# Patient Record
Sex: MALE | Race: WHITE | NOT HISPANIC OR LATINO | Employment: OTHER | URBAN - NONMETROPOLITAN AREA
[De-identification: names, ages, dates, MRNs, and addresses within clinical notes are randomized per-mention and may not be internally consistent; named-entity substitution may affect disease eponyms.]

---

## 2021-12-03 NOTE — PROGRESS NOTES
Patient: All Guzman    YOB: 1945    Date: 12/06/2021    Primary Care Provider: Armando Medrano DO    Chief Complaint   Patient presents with   • Cholelithiasis     Calculus of gallbladder without cholecystitis       SUBJECTIVE:    History of present illness:  I saw the patient in the office today as a consultation for evaluation and treatment of abdominal pain and diarrhea. Patient had recent CT scan of the abdomen and pelvis which showed cholelithiasis.    He does complain of right upper quadrant abdominal discomfort but he also complains of right sided chest discomfort associated with exercise.  He does have a history significant for coronary artery disease and has a cardiologist in [place], [place].    The following portions of the patient's history were reviewed and updated as appropriate: allergies, current medications, past family history, past medical history, past social history, past surgical history and problem list.    Review of Systems   Constitutional: Negative for chills, fever and unexpected weight change.   HENT: Negative for trouble swallowing and voice change.    Eyes: Negative for visual disturbance.   Respiratory: Negative for apnea, cough, chest tightness, shortness of breath and wheezing.    Cardiovascular: Negative for chest pain, palpitations and leg swelling.   Gastrointestinal: Positive for abdominal pain and diarrhea. Negative for abdominal distention, anal bleeding, blood in stool, constipation, nausea, rectal pain and vomiting.   Endocrine: Negative for cold intolerance and heat intolerance.   Genitourinary: Negative for difficulty urinating, dysuria, flank pain, scrotal swelling and testicular pain.   Musculoskeletal: Negative for back pain, gait problem and joint swelling.   Skin: Negative for color change, rash and wound.   Neurological: Negative for dizziness, syncope, speech difficulty, weakness, numbness and headaches.   Hematological: Negative for adenopathy.  Does not bruise/bleed easily.   Psychiatric/Behavioral: Negative for confusion. The patient is not nervous/anxious.        History:  Past Medical History:   Diagnosis Date   • Hypertension    • Myocardial infarction (HCC)        Past Surgical History:   Procedure Laterality Date   • EYE SURGERY     • TYMPANOPLASTY         Family History   Problem Relation Age of Onset   • Heart disease Mother    • Diabetes Mother    • Heart disease Father        Social History     Tobacco Use   • Smoking status: Former Smoker   • Smokeless tobacco: Current User     Types: Chew   Substance Use Topics   • Alcohol use: Defer   • Drug use: Defer       Allergies:  Allergies   Allergen Reactions   • Cefdinir Other (See Comments)       Medications:    Current Outpatient Medications:   •  ASPIRIN 81 PO, Take 1 tablet every day by oral route., Disp: , Rfl:   •  atenolol (TENORMIN) 50 MG tablet, atenolol 50 mg tablet, Disp: , Rfl:   •  cetirizine (Allergy Relief Cetirizine) 10 MG tablet, cetirizine   tab 10mgcetirizine hcl, Disp: , Rfl:   •  digoxin (LANOXIN) 125 MCG tablet, digoxin 125 mcg (0.125 mg) tablet  Take 1 tablet every day by oral route for 30 days., Disp: , Rfl:   •  HYDROcodone-homatropine (HYCODAN) 5-1.5 MG/5ML syrup, Take  by mouth Every 6 (Six) Hours As Needed for Cough., Disp: , Rfl:   •  lisinopril (PRINIVIL,ZESTRIL) 10 MG tablet, lisinopril 10 mg tablet  Take 1 tablet every day by oral route for 30 days., Disp: , Rfl:   •  lovastatin (MEVACOR) 20 MG tablet, lovastatin 20 mg tablet  Take 1 tablet every day by oral route for 30 days., Disp: , Rfl:   •  meclizine (ANTIVERT) 25 MG tablet, meclizine 25 mg tablet  Take 1 tablet as needed by oral route as needed for 30 days., Disp: , Rfl:   •  montelukast (SINGULAIR) 10 MG tablet, Take 10 mg by mouth Every Night., Disp: , Rfl:   •  omeprazole (priLOSEC) 20 MG capsule, omeprazole 20 mg capsule,delayed release, Disp: , Rfl:   •  rivaroxaban (Xarelto) 20 MG tablet, Xarelto 20 mg  "tablet  Take 1 tablet every day by oral route for 30 days., Disp: , Rfl:   •  tamsulosin (FLOMAX) 0.4 MG capsule 24 hr capsule, tamsulosin 0.4 mg capsule, Disp: , Rfl:     OBJECTIVE:    Vital Signs:   Vitals:    12/06/21 1103   BP: 120/62   Pulse: 90   Resp: 18   Temp: 97.1 °F (36.2 °C)   TempSrc: Temporal   SpO2: 98%   Weight: 85 kg (187 lb 6.4 oz)   Height: 182.9 cm (72\")       Physical Exam:   General Appearance:    Alert, cooperative, in no acute distress   Head:    Normocephalic, without obvious abnormality, atraumatic   Eyes:            Lids and lashes normal, conjunctivae and sclerae normal, no   icterus, no pallor, corneas clear, PERRLA   Ears:    Ears appear intact with no abnormalities noted   Throat:   No oral lesions, no thrush, oral mucosa moist   Neck:   No adenopathy, supple, trachea midline, no thyromegaly, no   carotid bruit, no JVD   Lungs:     Clear to auscultation,respirations regular, even and                  unlabored    Heart:    Regular rhythm and normal rate, normal S1 and S2, no            murmur   Abdomen:     no masses, no organomegaly, soft non-tender, non-distended, no guarding, there is evidence of right upper quadrant tenderness, slight right upper quadrant tenderness to deep palpation   Extremities:   Moves all extremities well, no edema, no cyanosis, no             redness   Pulses:   Pulses palpable and equal bilaterally   Skin:   No bleeding, bruising or rash   Lymph nodes:   No palpable adenopathy   Neurologic:   Cranial nerves 2 - 12 grossly intact, sensation intact      Results Review:   I reviewed the patient's new clinical results.  I reviewed the patient's new imaging results and agree with the interpretation.  I reviewed the patient's other test results and agree with the interpretation    Review of Systems was reviewed and confirmed as accurate as documented by the MA.    ASSESSMENT/PLAN:    1. Calculus of gallbladder without cholecystitis without obstruction        I had " a detailed and extensive discussion with the patient and his wife in the office today and I cannot guarantee him complete resolution of his symptomatology with surgical intervention.  I think given his history of coronary artery disease and right sided chest discomfort associated with exercise he needs to undergo further cardiac evaluation and clearance before surgical intervention is contemplated.  He wants to see his cardiologist in [place], [place] and I have asked him to schedule an appointment and then see me back in the office in follow-up.  I discussed the patients findings and my recommendations with patient.    Electronically signed by Clement Julien MD  12/08/21

## 2021-12-06 ENCOUNTER — OFFICE VISIT (OUTPATIENT)
Dept: SURGERY | Facility: CLINIC | Age: 76
End: 2021-12-06

## 2021-12-06 VITALS
HEART RATE: 90 BPM | SYSTOLIC BLOOD PRESSURE: 120 MMHG | WEIGHT: 187.4 LBS | TEMPERATURE: 97.1 F | OXYGEN SATURATION: 98 % | RESPIRATION RATE: 18 BRPM | BODY MASS INDEX: 25.38 KG/M2 | DIASTOLIC BLOOD PRESSURE: 62 MMHG | HEIGHT: 72 IN

## 2021-12-06 DIAGNOSIS — K80.20 CALCULUS OF GALLBLADDER WITHOUT CHOLECYSTITIS WITHOUT OBSTRUCTION: Primary | ICD-10-CM

## 2021-12-06 PROCEDURE — 99204 OFFICE O/P NEW MOD 45 MIN: CPT | Performed by: SURGERY

## 2021-12-06 RX ORDER — OMEPRAZOLE 20 MG/1
CAPSULE, DELAYED RELEASE ORAL
COMMUNITY

## 2021-12-06 RX ORDER — TAMSULOSIN HYDROCHLORIDE 0.4 MG/1
CAPSULE ORAL
COMMUNITY
Start: 2021-10-08

## 2021-12-06 RX ORDER — LOVASTATIN 20 MG/1
TABLET ORAL
COMMUNITY
Start: 2021-10-08

## 2021-12-06 RX ORDER — LISINOPRIL 10 MG/1
TABLET ORAL
COMMUNITY
Start: 2021-10-08

## 2021-12-06 RX ORDER — MONTELUKAST SODIUM 10 MG/1
10 TABLET ORAL NIGHTLY
COMMUNITY

## 2021-12-06 RX ORDER — MECLIZINE HYDROCHLORIDE 25 MG/1
TABLET ORAL
COMMUNITY

## 2021-12-06 RX ORDER — CETIRIZINE HYDROCHLORIDE 10 MG/1
TABLET ORAL
COMMUNITY

## 2021-12-06 RX ORDER — ATENOLOL 50 MG/1
TABLET ORAL
COMMUNITY
Start: 2021-10-08

## 2021-12-06 RX ORDER — DIGOXIN 125 MCG
TABLET ORAL
COMMUNITY
Start: 2021-10-08

## 2022-09-15 ENCOUNTER — OFFICE VISIT (OUTPATIENT)
Dept: PULMONOLOGY | Facility: CLINIC | Age: 77
End: 2022-09-15

## 2022-09-15 ENCOUNTER — APPOINTMENT (OUTPATIENT)
Dept: OTHER | Facility: HOSPITAL | Age: 77
End: 2022-09-15

## 2022-09-15 VITALS
HEART RATE: 102 BPM | BODY MASS INDEX: 29.81 KG/M2 | TEMPERATURE: 98.6 F | WEIGHT: 162 LBS | SYSTOLIC BLOOD PRESSURE: 130 MMHG | OXYGEN SATURATION: 99 % | HEIGHT: 62 IN | DIASTOLIC BLOOD PRESSURE: 82 MMHG

## 2022-09-15 DIAGNOSIS — R06.02 SHORTNESS OF BREATH: Primary | ICD-10-CM

## 2022-09-15 DIAGNOSIS — R91.1 LUNG NODULE: ICD-10-CM

## 2022-09-15 PROCEDURE — 94729 DIFFUSING CAPACITY: CPT | Performed by: INTERNAL MEDICINE

## 2022-09-15 PROCEDURE — 94726 PLETHYSMOGRAPHY LUNG VOLUMES: CPT | Performed by: INTERNAL MEDICINE

## 2022-09-15 PROCEDURE — 99204 OFFICE O/P NEW MOD 45 MIN: CPT | Performed by: INTERNAL MEDICINE

## 2022-09-15 PROCEDURE — 94375 RESPIRATORY FLOW VOLUME LOOP: CPT | Performed by: INTERNAL MEDICINE

## 2022-09-15 RX ORDER — CHOLESTYRAMINE 4 G/9G
1 POWDER, FOR SUSPENSION ORAL AS NEEDED
COMMUNITY

## 2022-09-15 RX ORDER — GABAPENTIN 300 MG/1
1 CAPSULE ORAL 2 TIMES DAILY
COMMUNITY

## 2022-09-15 RX ORDER — ALENDRONATE SODIUM 70 MG/1
1 TABLET ORAL
COMMUNITY

## 2022-09-15 RX ORDER — ALBUTEROL SULFATE 90 UG/1
1 AEROSOL, METERED RESPIRATORY (INHALATION) AS NEEDED
COMMUNITY
End: 2022-09-16 | Stop reason: SDUPTHER

## 2022-09-15 RX ORDER — FINASTERIDE 5 MG/1
1 TABLET, FILM COATED ORAL DAILY
COMMUNITY

## 2022-09-16 RX ORDER — ALBUTEROL SULFATE 90 UG/1
1 AEROSOL, METERED RESPIRATORY (INHALATION) AS NEEDED
Qty: 18 G | Refills: 11 | Status: SHIPPED | OUTPATIENT
Start: 2022-09-16

## 2022-09-16 NOTE — PROGRESS NOTES
"CC:    Cough and shortness of breath    HPI:    78 y/o WM w/ h/o extensive tobacco abuse (age [age], \"rolled his own\" unfiltered cigs and went through a \"tin a day\") which is hard to quantify but I would estimate ~75 py at least, COPD, severe thoracic kyphosis with multiple compression fractures, Afib, HTN, HLD, GERD, BPH, recently had COVID July 2022 (outpatient), who presents with increased DANIELS.  He also has chest tightness and productive cough.  Sometimes wheezes.  Does have some allergy symptoms worse at night.  Follows with cardiology as well.  Has had hoarseness recently as well.    INTERVAL HISTORY:        PMH:    Past Medical History:   Diagnosis Date   • Hypertension    • Myocardial infarction (HCC)      PSH:    Past Surgical History:   Procedure Laterality Date   • EYE SURGERY     • TYMPANOPLASTY       FH:    Family History   Problem Relation Age of Onset   • Heart disease Mother    • Diabetes Mother    • Heart disease Father      SH:    Social History     Socioeconomic History   • Marital status:    Tobacco Use   • Smoking status: Former Smoker   • Smokeless tobacco: Current User     Types: Chew   Vaping Use   • Vaping Use: Never used   Substance and Sexual Activity   • Alcohol use: Defer   • Drug use: Defer   • Sexual activity: Defer     ALLERGIES:    Allergies   Allergen Reactions   • Cefdinir Other (See Comments)     MEDICATIONS:      Current Outpatient Medications:   •  albuterol sulfate  (90 Base) MCG/ACT inhaler, Inhale 1 puff As Needed., Disp: , Rfl:   •  alendronate (FOSAMAX) 70 MG tablet, Take 1 tablet by mouth Every 7 (Seven) Days., Disp: , Rfl:   •  ASPIRIN 81 PO, Take 1 tablet every day by oral route., Disp: , Rfl:   •  atenolol (TENORMIN) 50 MG tablet, atenolol 50 mg tablet, Disp: , Rfl:   •  Calcium Carbonate-Vitamin D3 600-400 MG-UNIT tablet, Take 1 tablet by mouth 2 (Two) Times a Day., Disp: , Rfl:   •  cetirizine (zyrTEC) 10 MG tablet, cetirizine   tab 10mgcetirizine hcl, " Disp: , Rfl:   •  cholestyramine (QUESTRAN) 4 g packet, Take 1 packet by mouth As Needed., Disp: , Rfl:   •  digoxin (LANOXIN) 125 MCG tablet, digoxin 125 mcg (0.125 mg) tablet  Take 1 tablet every day by oral route for 30 days., Disp: , Rfl:   •  finasteride (PROSCAR) 5 MG tablet, Take 1 tablet by mouth Daily., Disp: , Rfl:   •  gabapentin (NEURONTIN) 300 MG capsule, Take 1 capsule by mouth 2 (Two) Times a Day., Disp: , Rfl:   •  HYDROcodone-homatropine (HYCODAN) 5-1.5 MG/5ML syrup, Take  by mouth Every 6 (Six) Hours As Needed for Cough., Disp: , Rfl:   •  lisinopril (PRINIVIL,ZESTRIL) 10 MG tablet, lisinopril 10 mg tablet  Take 1 tablet every day by oral route for 30 days., Disp: , Rfl:   •  lovastatin (MEVACOR) 20 MG tablet, lovastatin 20 mg tablet  Take 1 tablet every day by oral route for 30 days., Disp: , Rfl:   •  meclizine (ANTIVERT) 25 MG tablet, meclizine 25 mg tablet  Take 1 tablet as needed by oral route as needed for 30 days., Disp: , Rfl:   •  montelukast (SINGULAIR) 10 MG tablet, Take 10 mg by mouth Every Night., Disp: , Rfl:   •  omeprazole (priLOSEC) 20 MG capsule, omeprazole 20 mg capsule,delayed release, Disp: , Rfl:   •  rivaroxaban (XARELTO) 20 MG tablet, Xarelto 20 mg tablet  Take 1 tablet every day by oral route for 30 days., Disp: , Rfl:   •  tamsulosin (FLOMAX) 0.4 MG capsule 24 hr capsule, tamsulosin 0.4 mg capsule, Disp: , Rfl:     ROS:  Per HPI    DIAGNOSTIC DATA (Reviewed and interpreted by me unless otherwise specified):    PFT no obstruciton, no restriction, +air trapping, normal DLCO        Vitals:    09/15/22 1328   BP: 130/82   Pulse: 102   Temp: 98.6 °F (37 °C)   SpO2: 99%       Physical Exam:    Constitutional: Alert, no Distress  Mouth/Throat: Oropharynx is clear and moist.   Cardiovascular: Normal rate, regular rhythm, normal heart sounds.   Pulmonary/Chest: Effort normal and breath sounds with mild wheezing.  No clubbing.     Assessment & Plan     Extensive tobacco Abuse quit  ~age 45  COPD - PFTs offset by severe thoracic kyphosis  Allergic Rhinitis  Possible Asthma  Hoarseness  Start trelegy 200 1 puff daily and albuterol prn.  Add flonase / antihistamine / delsym prn.  Check back in 3 months w/ CT Chest and 6MW.  Needs to see ENT if hoarseness persists.    RTC in 3 months w/ 6MW and CT Chest w/o contrast    Jorge Zurita MD  Pulmonology and Critical Care Medicine  09/16/22 13:13 EDT  Electronically Signed    C.C.:    Armando Medrano DO, Armando Medrano DO

## 2023-01-06 ENCOUNTER — HOSPITAL ENCOUNTER (OUTPATIENT)
Dept: CT IMAGING | Facility: HOSPITAL | Age: 78
Discharge: HOME | End: 2023-01-06
Admitting: INTERNAL MEDICINE
Payer: MEDICARE

## 2023-01-06 DIAGNOSIS — R91.1 LUNG NODULE: ICD-10-CM

## 2023-01-06 PROCEDURE — 71250 CT THORAX DX C-: CPT

## 2023-02-27 ENCOUNTER — OFFICE VISIT (OUTPATIENT)
Dept: PULMONOLOGY | Facility: CLINIC | Age: 78
End: 2023-02-27
Payer: MEDICARE

## 2023-02-27 VITALS
DIASTOLIC BLOOD PRESSURE: 82 MMHG | SYSTOLIC BLOOD PRESSURE: 130 MMHG | HEIGHT: 61 IN | WEIGHT: 150 LBS | HEART RATE: 117 BPM | TEMPERATURE: 98.2 F | OXYGEN SATURATION: 96 % | BODY MASS INDEX: 28.32 KG/M2

## 2023-02-27 DIAGNOSIS — R91.1 LUNG NODULE: Primary | ICD-10-CM

## 2023-02-27 PROCEDURE — 99214 OFFICE O/P EST MOD 30 MIN: CPT | Performed by: INTERNAL MEDICINE

## 2023-02-27 RX ORDER — OMEPRAZOLE 40 MG/1
CAPSULE, DELAYED RELEASE ORAL
COMMUNITY
Start: 2023-02-16

## 2023-02-27 RX ORDER — PREDNISONE 10 MG/1
TABLET ORAL
COMMUNITY
Start: 2023-01-05

## 2023-02-27 RX ORDER — NITROGLYCERIN 0.4 MG/1
TABLET SUBLINGUAL
COMMUNITY
Start: 2023-01-17

## 2023-02-27 RX ORDER — HYDROCODONE BITARTRATE AND ACETAMINOPHEN 7.5; 325 MG/1; MG/1
1 TABLET ORAL
COMMUNITY
Start: 2022-11-23

## 2023-02-27 RX ORDER — POTASSIUM CHLORIDE 750 MG/1
TABLET, FILM COATED, EXTENDED RELEASE ORAL
COMMUNITY

## 2023-02-27 RX ORDER — ISOSORBIDE MONONITRATE 60 MG/1
TABLET, EXTENDED RELEASE ORAL
COMMUNITY
Start: 2023-02-16

## 2023-02-27 RX ORDER — NAPROXEN 500 MG/1
TABLET ORAL
COMMUNITY
Start: 2023-01-03

## 2023-02-27 NOTE — PROGRESS NOTES
"CC:    Cough and shortness of breath    HPI:    76 y/o WM w/ h/o extensive tobacco abuse (age [age], \"rolled his own\" unfiltered cigs and went through a \"tin a day\") which is hard to quantify but I would estimate ~75 py at least, COPD, severe thoracic kyphosis with multiple compression fractures, Afib, HTN, HLD, GERD, BPH, recently had COVID July 2022 (outpatient), who presents with increased DANIELS.  He also has chest tightness and productive cough.  Sometimes wheezes.  Does have some allergy symptoms worse at night.  Follows with cardiology as well.  Has had hoarseness recently as well.    INTERVAL HISTORY:    Returns today for follow up.  Doing a little better on Trelegy.  Using albuterol neb at night.  Still has post nasal gtt, hoarseness, feels like phlegm getting stuck in upper airway.    PMH:    Past Medical History:   Diagnosis Date   • Hypertension    • Myocardial infarction (HCC)      PSH:    Past Surgical History:   Procedure Laterality Date   • EYE SURGERY     • TYMPANOPLASTY       FH:    Family History   Problem Relation Age of Onset   • Heart disease Mother    • Diabetes Mother    • Heart disease Father      SH:    Social History     Socioeconomic History   • Marital status:    Tobacco Use   • Smoking status: Former   • Smokeless tobacco: Current     Types: Chew   Vaping Use   • Vaping Use: Never used   Substance and Sexual Activity   • Alcohol use: Defer   • Drug use: Defer   • Sexual activity: Defer     ALLERGIES:    Allergies   Allergen Reactions   • Cefdinir Other (See Comments)     MEDICATIONS:      Current Outpatient Medications:   •  albuterol sulfate  (90 Base) MCG/ACT inhaler, Inhale 1 puff As Needed for Wheezing or Shortness of Air., Disp: 18 g, Rfl: 11  •  alendronate (FOSAMAX) 70 MG tablet, Take 1 tablet by mouth Every 7 (Seven) Days., Disp: , Rfl:   •  ASPIRIN 81 PO, Take 1 tablet every day by oral route., Disp: , Rfl:   •  atenolol (TENORMIN) 50 MG tablet, atenolol 50 mg tablet, " Disp: , Rfl:   •  Calcium Carbonate-Vitamin D3 600-400 MG-UNIT tablet, Take 1 tablet by mouth 2 (Two) Times a Day., Disp: , Rfl:   •  cetirizine (zyrTEC) 10 MG tablet, cetirizine   tab 10mgcetirizine hcl, Disp: , Rfl:   •  cholestyramine (QUESTRAN) 4 g packet, Take 1 packet by mouth As Needed., Disp: , Rfl:   •  digoxin (LANOXIN) 125 MCG tablet, digoxin 125 mcg (0.125 mg) tablet  Take 1 tablet every day by oral route for 30 days., Disp: , Rfl:   •  finasteride (PROSCAR) 5 MG tablet, Take 1 tablet by mouth Daily., Disp: , Rfl:   •  Fluticasone-Umeclidin-Vilant (Trelegy Ellipta) 200-62.5-25 MCG/INH inhaler, Inhale 1 puff Daily., Disp: 1 each, Rfl: 11  •  gabapentin (NEURONTIN) 300 MG capsule, Take 1 capsule by mouth 2 (Two) Times a Day., Disp: , Rfl:   •  HYDROcodone-acetaminophen (NORCO) 7.5-325 MG per tablet, 1 tablet., Disp: , Rfl:   •  HYDROcodone-homatropine (HYCODAN) 5-1.5 MG/5ML syrup, Take  by mouth Every 6 (Six) Hours As Needed for Cough., Disp: , Rfl:   •  isosorbide mononitrate (IMDUR) 60 MG 24 hr tablet, , Disp: , Rfl:   •  lisinopril (PRINIVIL,ZESTRIL) 10 MG tablet, lisinopril 10 mg tablet  Take 1 tablet every day by oral route for 30 days., Disp: , Rfl:   •  lovastatin (MEVACOR) 20 MG tablet, lovastatin 20 mg tablet  Take 1 tablet every day by oral route for 30 days., Disp: , Rfl:   •  meclizine (ANTIVERT) 25 MG tablet, meclizine 25 mg tablet  Take 1 tablet as needed by oral route as needed for 30 days., Disp: , Rfl:   •  montelukast (SINGULAIR) 10 MG tablet, Take 10 mg by mouth Every Night., Disp: , Rfl:   •  naproxen (NAPROSYN) 500 MG tablet, naproxen 500 mg tablet, Disp: , Rfl:   •  nitroglycerin (NITROSTAT) 0.4 MG SL tablet, , Disp: , Rfl:   •  omeprazole (priLOSEC) 20 MG capsule, omeprazole 20 mg capsule,delayed release, Disp: , Rfl:   •  omeprazole (priLOSEC) 40 MG capsule, , Disp: , Rfl:   •  potassium chloride 10 MEQ CR tablet, potassium chloride ER 10 mEq tablet,extended release, Disp: , Rfl:    •  predniSONE (DELTASONE) 10 MG tablet, , Disp: , Rfl:   •  rivaroxaban (XARELTO) 20 MG tablet, Xarelto 20 mg tablet  Take 1 tablet every day by oral route for 30 days., Disp: , Rfl:   •  tamsulosin (FLOMAX) 0.4 MG capsule 24 hr capsule, tamsulosin 0.4 mg capsule, Disp: , Rfl:     ROS:  Per HPI    DIAGNOSTIC DATA (Reviewed and interpreted by me unless otherwise specified):    PFT 9/15/22 no obstruciton, no restriction, +air trapping, normal DLCO    6MW 2/27/23 starting sat 96% on RA, min sat 95% on RA, walked 274 meters, 61% predicted    CT Chest 1/6/23 - upper lobe predominant emphysema, bilateral lower lobe atelectasis likely secondary to severe thoracic kyphosis, scattered sub centimeter nodules    Vitals:    02/27/23 1542   BP: 130/82   Pulse: 117   Temp: 98.2 °F (36.8 °C)   SpO2: 96%       Physical Exam:    Constitutional: Alert, no Distress  Mouth/Throat: Oropharynx is clear and moist.   Cardiovascular: Normal rate, regular rhythm, normal heart sounds.   Pulmonary/Chest: Effort normal and breath sounds with mild wheezing.  No clubbing.     Assessment & Plan     Extensive tobacco Abuse quit ~age 45  COPD - PFTs offset by severe thoracic kyphosis  Allergic Rhinitis  Possible Asthma  Hoarseness  Abnormal CT Chest  Continue trelegy 200 1 puff daily and albuterol prn.  Continue flonase / antihistamine / delsym prn.      Albuterol bid w/ flutter for airway clearance.    Refer to ENT for persistent hoarseness.    CT Chest in 6 months for lung nodule.  Due in June.    RTC in June w/ CT Chest w/o contrast    Jorge Zurita MD  Pulmonology and Critical Care Medicine  02/27/23 15:47 EST  Electronically Signed    C.C.:    No ref. provider found, Armando Medrano,

## 2023-03-06 DIAGNOSIS — R06.02 SHORTNESS OF BREATH: Primary | ICD-10-CM
